# Patient Record
Sex: FEMALE | Race: BLACK OR AFRICAN AMERICAN | NOT HISPANIC OR LATINO | Employment: FULL TIME | URBAN - METROPOLITAN AREA
[De-identification: names, ages, dates, MRNs, and addresses within clinical notes are randomized per-mention and may not be internally consistent; named-entity substitution may affect disease eponyms.]

---

## 2022-05-16 ENCOUNTER — APPOINTMENT (OUTPATIENT)
Dept: RADIOLOGY | Facility: CLINIC | Age: 32
End: 2022-05-16
Attending: EMERGENCY MEDICINE
Payer: COMMERCIAL

## 2022-05-16 ENCOUNTER — HOSPITAL ENCOUNTER (EMERGENCY)
Facility: CLINIC | Age: 32
Discharge: HOME | End: 2022-05-16
Attending: EMERGENCY MEDICINE | Admitting: EMERGENCY MEDICINE
Payer: COMMERCIAL

## 2022-05-16 VITALS
SYSTOLIC BLOOD PRESSURE: 105 MMHG | BODY MASS INDEX: 45.16 KG/M2 | DIASTOLIC BLOOD PRESSURE: 89 MMHG | WEIGHT: 281 LBS | HEIGHT: 66 IN | HEART RATE: 80 BPM | TEMPERATURE: 97.6 F | OXYGEN SATURATION: 99 % | RESPIRATION RATE: 24 BRPM

## 2022-05-16 DIAGNOSIS — J45.901 MILD ASTHMA WITH EXACERBATION, UNSPECIFIED WHETHER PERSISTENT: ICD-10-CM

## 2022-05-16 LAB
ANION GAP SERPL CALCULATED.3IONS-SCNC: 11 MMOL/L (ref 5–18)
ATRIAL RATE - MUSE: 101 BPM
BASOPHILS # BLD AUTO: 0 10E3/UL (ref 0–0.2)
BASOPHILS NFR BLD AUTO: 0 %
BNP SERPL-MCNC: <10 PG/ML (ref 0–64)
BUN SERPL-MCNC: 12 MG/DL (ref 8–22)
CALCIUM SERPL-MCNC: 9.3 MG/DL (ref 8.5–10.5)
CHLORIDE BLD-SCNC: 106 MMOL/L (ref 98–107)
CO2 SERPL-SCNC: 21 MMOL/L (ref 22–31)
CREAT SERPL-MCNC: 1.04 MG/DL (ref 0.6–1.1)
DIASTOLIC BLOOD PRESSURE - MUSE: NORMAL MMHG
EOSINOPHIL # BLD AUTO: 0.1 10E3/UL (ref 0–0.7)
EOSINOPHIL NFR BLD AUTO: 1 %
ERYTHROCYTE [DISTWIDTH] IN BLOOD BY AUTOMATED COUNT: 12.7 % (ref 10–15)
FLUAV RNA SPEC QL NAA+PROBE: NEGATIVE
FLUBV RNA RESP QL NAA+PROBE: NEGATIVE
GFR SERPL CREATININE-BSD FRML MDRD: 74 ML/MIN/1.73M2
GLUCOSE BLD-MCNC: 80 MG/DL (ref 70–125)
HCG SERPL QL: NEGATIVE
HCT VFR BLD AUTO: 40.9 % (ref 35–47)
HGB BLD-MCNC: 13 G/DL (ref 11.7–15.7)
IMM GRANULOCYTES # BLD: 0 10E3/UL
IMM GRANULOCYTES NFR BLD: 0 %
INTERPRETATION ECG - MUSE: NORMAL
LYMPHOCYTES # BLD AUTO: 1.9 10E3/UL (ref 0.8–5.3)
LYMPHOCYTES NFR BLD AUTO: 22 %
MCH RBC QN AUTO: 28.8 PG (ref 26.5–33)
MCHC RBC AUTO-ENTMCNC: 31.8 G/DL (ref 31.5–36.5)
MCV RBC AUTO: 91 FL (ref 78–100)
MONOCYTES # BLD AUTO: 0.6 10E3/UL (ref 0–1.3)
MONOCYTES NFR BLD AUTO: 7 %
NEUTROPHILS # BLD AUTO: 6 10E3/UL (ref 1.6–8.3)
NEUTROPHILS NFR BLD AUTO: 70 %
NRBC # BLD AUTO: 0 10E3/UL
NRBC BLD AUTO-RTO: 0 /100
P AXIS - MUSE: 57 DEGREES
PLATELET # BLD AUTO: 278 10E3/UL (ref 150–450)
POTASSIUM BLD-SCNC: 4.5 MMOL/L (ref 3.5–5)
PR INTERVAL - MUSE: 150 MS
QRS DURATION - MUSE: 82 MS
QT - MUSE: 336 MS
QTC - MUSE: 435 MS
R AXIS - MUSE: 74 DEGREES
RBC # BLD AUTO: 4.52 10E6/UL (ref 3.8–5.2)
RSV RNA SPEC NAA+PROBE: NEGATIVE
SARS-COV-2 RNA RESP QL NAA+PROBE: NEGATIVE
SODIUM SERPL-SCNC: 138 MMOL/L (ref 136–145)
SYSTOLIC BLOOD PRESSURE - MUSE: NORMAL MMHG
T AXIS - MUSE: -24 DEGREES
TROPONIN I SERPL-MCNC: 0.13 NG/ML (ref 0–0.29)
VENTRICULAR RATE- MUSE: 101 BPM
WBC # BLD AUTO: 8.5 10E3/UL (ref 4–11)

## 2022-05-16 PROCEDURE — 84703 CHORIONIC GONADOTROPIN ASSAY: CPT | Performed by: EMERGENCY MEDICINE

## 2022-05-16 PROCEDURE — 36415 COLL VENOUS BLD VENIPUNCTURE: CPT | Performed by: EMERGENCY MEDICINE

## 2022-05-16 PROCEDURE — 85025 COMPLETE CBC W/AUTO DIFF WBC: CPT | Performed by: EMERGENCY MEDICINE

## 2022-05-16 PROCEDURE — 87637 SARSCOV2&INF A&B&RSV AMP PRB: CPT | Performed by: EMERGENCY MEDICINE

## 2022-05-16 PROCEDURE — 84484 ASSAY OF TROPONIN QUANT: CPT | Performed by: EMERGENCY MEDICINE

## 2022-05-16 PROCEDURE — 93005 ELECTROCARDIOGRAM TRACING: CPT | Performed by: EMERGENCY MEDICINE

## 2022-05-16 PROCEDURE — 94640 AIRWAY INHALATION TREATMENT: CPT

## 2022-05-16 PROCEDURE — 82310 ASSAY OF CALCIUM: CPT | Performed by: EMERGENCY MEDICINE

## 2022-05-16 PROCEDURE — 999N000157 HC STATISTIC RCP TIME EA 10 MIN

## 2022-05-16 PROCEDURE — C9803 HOPD COVID-19 SPEC COLLECT: HCPCS

## 2022-05-16 PROCEDURE — 71045 X-RAY EXAM CHEST 1 VIEW: CPT

## 2022-05-16 PROCEDURE — 83880 ASSAY OF NATRIURETIC PEPTIDE: CPT | Performed by: EMERGENCY MEDICINE

## 2022-05-16 PROCEDURE — 250N000009 HC RX 250

## 2022-05-16 PROCEDURE — 99285 EMERGENCY DEPT VISIT HI MDM: CPT | Mod: CS,25

## 2022-05-16 RX ORDER — ALBUTEROL SULFATE 90 UG/1
2 AEROSOL, METERED RESPIRATORY (INHALATION) EVERY 6 HOURS PRN
Qty: 18 G | Refills: 0 | Status: SHIPPED | OUTPATIENT
Start: 2022-05-16

## 2022-05-16 RX ORDER — ALBUTEROL SULFATE 0.83 MG/ML
2.5 SOLUTION RESPIRATORY (INHALATION)
Status: DISCONTINUED | OUTPATIENT
Start: 2022-05-16 | End: 2022-05-16 | Stop reason: HOSPADM

## 2022-05-16 RX ORDER — IPRATROPIUM BROMIDE AND ALBUTEROL SULFATE 2.5; .5 MG/3ML; MG/3ML
SOLUTION RESPIRATORY (INHALATION)
Status: COMPLETED
Start: 2022-05-16 | End: 2022-05-16

## 2022-05-16 RX ADMIN — IPRATROPIUM BROMIDE AND ALBUTEROL SULFATE 3 ML: 2.5; .5 SOLUTION RESPIRATORY (INHALATION) at 12:16

## 2022-05-16 NOTE — DISCHARGE INSTRUCTIONS
Our primary care medical scheduling team will reach out to you this week to help you to set up an appointment

## 2022-05-16 NOTE — ED PROVIDER NOTES
EMERGENCY DEPARTMENT ENCOUNTER      NAME: Slime Berrios  AGE: 31 year old female  YOB: 1990  MRN: [id number]  EVALUATION DATE & TIME: 5/16/2022 12:04 PM    PCP: No primary care provider on file.    ED PROVIDER: Brandi Otero M.D.      Chief Complaint   Patient presents with     Shortness of Breath     Chest Pain         FINAL IMPRESSION:  1. Mild asthma with exacerbation, unspecified whether persistent          ED COURSE & MEDICAL DECISION MAKING:    ED Course as of 05/16/22 1606   Mon May 16, 2022   1308 Pt reassuringly with normal VS, sat 99% RA and CXR unremarkable, pt given albuterol x1 and feels improved, felt like she had episode of SOB after workout and vaping, amenable to hold on vaping, h/o asthma, needs MDI to have at home which sounds reasaonble. Screening XR and labs pending and will clincially reasses,s pt ameanble to plan.   1358 EKG reassuring, CXR negative for acute pathology and COVID19 and influenza and RSV negative.    1358 Screening chemistry and CBC  WNL and patient eager for discontinue to home, givne Rx for albuterol MDI and PMD f/up. Patient discharged after being provided with extensive anticipatory guidance and given return precautions, importance of PMD follow-up emphasized.        Pertinent Labs & Imaging studies reviewed. (See chart for details)    N95 worn  A face shield was worn also  COVID PPE    12:59 PM I met with the patient, obtained history, performed an initial exam, and discussed options and plan for diagnostics and treatment here in the ED.  2:05 PM I rechecked and updated the patient on results. Discussed plans for discharge.    At the conclusion of the encounter I discussed the results of all of the tests and the disposition. The questions were answered. The patient or family acknowledged understanding and was agreeable with the care plan.     MEDICATIONS GIVEN IN THE EMERGENCY:  Medications   albuterol (PROVENTIL) neb solution 2.5 mg (has no administration  in time range)   ipratropium - albuterol 0.5 mg/2.5 mg/3 mL (DUONEB) 0.5-2.5 (3) MG/3ML neb solution (3 mLs  Given 5/16/22 1216)       NEW PRESCRIPTIONS STARTED AT TODAY'S ER VISIT  Discharge Medication List as of 5/16/2022  2:26 PM      START taking these medications    Details   albuterol (PROAIR HFA/PROVENTIL HFA/VENTOLIN HFA) 108 (90 Base) MCG/ACT inhaler Inhale 2 puffs into the lungs every 6 hours as needed for shortness of breath / dyspnea or wheezing, Disp-18 g, R-0, [facility] may dispense brand covered by insurance (Proair, or proventil or ventolin or generic albuterol inhaler)                =================================================================    HPI      Slime Berrios is a 31 year old female with PMHx of asthma who presents to the ED today via walk in with cough, shortness of breath. Patient reports she was exercising earlier today and started to develop a cough, lightheadedness, shortness of breath. Afterwards, she took a hit of her vape which she reports made her symptoms worse. She has a history of asthma.    Denies recent steroid use. Patient states she has not used an inhaler in a wellington time and is currently requesting one. Denies personal history of DVT, PE. No recent sick contacts. Patient is not concerned for risk of pregnancy.      REVIEW OF SYSTEMS   All other systems reviewed and are negative except as noted above in HPI.    PAST MEDICAL HISTORY:  History reviewed. No pertinent past medical history.    PAST SURGICAL HISTORY:  History reviewed. No pertinent surgical history.    CURRENT MEDICATIONS:    albuterol (PROAIR HFA/PROVENTIL HFA/VENTOLIN HFA) 108 (90 Base) MCG/ACT inhaler        ALLERGIES:  Allergies   Allergen Reactions     Cephalosporins Unknown       FAMILY HISTORY:  History reviewed. No pertinent family history.    SOCIAL HISTORY:   Social History     Socioeconomic History     Marital status: Single       VITALS:  Patient Vitals for the past 24 hrs:   BP Temp  "Temp src Pulse Resp SpO2 Height Weight   05/16/22 1216 -- -- -- -- -- 99 % -- --   05/16/22 1150 105/89 97.6  F (36.4  C) Oral 80 24 99 % 1.676 m (5' 6\") 127.5 kg (281 lb)       PHYSICAL EXAM    GENERAL: Awake, alert.  In no acute distress.   HEENT: Normocephalic, atraumatic.  Pupils equal, round and reactive.  Conjunctiva normal.  EOMI.  NECK: No stridor or apparent deformity.  PULMONARY: Symmetrical breath sounds without distress.  Lungs clear to auscultation bilaterally without wheezes, rhonchi or rales.  CARDIO: Regular rate and rhythm.  No significant murmur, rub or gallop.  Radial pulses strong and symmetrical.  ABDOMINAL: Abdomen soft, non-distended and non-tender to palpation.  No CVAT, no palpable hepatosplenomegaly.  EXTREMITIES: No lower extremity swelling or edema.    NEURO: Alert and oriented to person, place and time.  Cranial nerves grossly intact.  No focal motor deficit.  PSYCH: Normal mood and affect  SKIN: No rashes      LAB:  All pertinent labs reviewed and interpreted.  Results for orders placed or performed during the hospital encounter of 05/16/22   XR Chest 1 View    Impression    IMPRESSION: No pneumothorax or pleural effusion. The lungs appear clear. Normal heart size. No pulmonary vascular congestion.   Basic metabolic panel   Result Value Ref Range    Sodium 138 136 - 145 mmol/L    Potassium 4.5 3.5 - 5.0 mmol/L    Chloride 106 98 - 107 mmol/L    Carbon Dioxide (CO2) 21 (L) 22 - 31 mmol/L    Anion Gap 11 5 - 18 mmol/L    Urea Nitrogen 12 8 - 22 mg/dL    Creatinine 1.04 0.60 - 1.10 mg/dL    Calcium 9.3 8.5 - 10.5 mg/dL    Glucose 80 70 - 125 mg/dL    GFR Estimate 74 >60 mL/min/1.73m2   Result Value Ref Range    Troponin I 0.13 0.00 - 0.29 ng/mL   B-Type Natriuretic Peptide ([facility] Only)   Result Value Ref Range    BNP <10 0 - 64 pg/mL   Symptomatic; Unknown Influenza A/B & SARS-CoV2 (COVID-19) Virus PCR Multiplex Nasopharyngeal    Specimen: Nasopharyngeal; Swab   Result Value Ref Range    " Influenza A PCR Negative Negative    Influenza B PCR Negative Negative    RSV PCR Negative Negative    SARS CoV2 PCR Negative Negative   HCG qualitative Blood   Result Value Ref Range    hCG Serum Qualitative Negative Negative   CBC with platelets and differential   Result Value Ref Range    WBC Count 8.5 4.0 - 11.0 10e3/uL    RBC Count 4.52 3.80 - 5.20 10e6/uL    Hemoglobin 13.0 11.7 - 15.7 g/dL    Hematocrit 40.9 35.0 - 47.0 %    MCV 91 78 - 100 fL    MCH 28.8 26.5 - 33.0 pg    MCHC 31.8 31.5 - 36.5 g/dL    RDW 12.7 10.0 - 15.0 %    Platelet Count 278 150 - 450 10e3/uL    % Neutrophils 70 %    % Lymphocytes 22 %    % Monocytes 7 %    % Eosinophils 1 %    % Basophils 0 %    % Immature Granulocytes 0 %    NRBCs per 100 WBC 0 <1 /100    Absolute Neutrophils 6.0 1.6 - 8.3 10e3/uL    Absolute Lymphocytes 1.9 0.8 - 5.3 10e3/uL    Absolute Monocytes 0.6 0.0 - 1.3 10e3/uL    Absolute Eosinophils 0.1 0.0 - 0.7 10e3/uL    Absolute Basophils 0.0 0.0 - 0.2 10e3/uL    Absolute Immature Granulocytes 0.0 <=0.4 10e3/uL    Absolute NRBCs 0.0 10e3/uL   ECG 12-LEAD WITH MUSE ([facility])   Result Value Ref Range    Systolic Blood Pressure  mmHg    Diastolic Blood Pressure  mmHg    Ventricular Rate 101 BPM    Atrial Rate 101 BPM    MO Interval 150 ms    QRS Duration 82 ms     ms    QTc 435 ms    P Axis 57 degrees    R AXIS 74 degrees    T Axis -24 degrees    Interpretation ECG       Sinus tachycardia  Right atrial enlargement  T wave abnormality, consider inferior ischemia  Abnormal ECG  No previous ECGs available  Confirmed by SEE ED PROVIDER NOTE FOR, ECG INTERPRETATION ([id number]),  MILLY ROSALES ([id number]) on 5/16/2022 1:04:50 PM         RADIOLOGY:  Reviewed all pertinent imaging. Please see official radiology report.  XR Chest 1 View   Final Result   IMPRESSION: No pneumothorax or pleural effusion. The lungs appear clear. Normal heart size. No pulmonary vascular congestion.            EKG:    Reviewed and interpreted as: 1154  sinus rhythm  without ST changes, T waves inverted isolated to III and aVF      I have independently reviewed and interpreted the EKG(s) documented above.        I, Serafin Hanks, am serving as a scribe to document services personally performed by Dr. Brandi Otero based on my observation and the provider's statements to me. I, Brandi Otero MD attest that Serafin Hanks is acting in a scribe capacity, has observed my performance of the services and has documented them in accordance with my direction.     Brandi Otero MD  05/16/22 [id number]

## 2022-05-16 NOTE — ED TRIAGE NOTES
Pt reports cough, shortness of breath and chest pain that started after a hard workout about 15 mins ago.  Pt states she also took a vape at the same time.  Pt reports feeling fine prior to the workout.

## 2023-12-06 ENCOUNTER — HOSPITAL ENCOUNTER (EMERGENCY)
Facility: CLINIC | Age: 33
Discharge: HOME | End: 2023-12-06
Attending: EMERGENCY MEDICINE | Admitting: EMERGENCY MEDICINE
Payer: COMMERCIAL

## 2023-12-06 VITALS
WEIGHT: 292 LBS | BODY MASS INDEX: 47.13 KG/M2 | OXYGEN SATURATION: 96 % | DIASTOLIC BLOOD PRESSURE: 78 MMHG | SYSTOLIC BLOOD PRESSURE: 124 MMHG | HEART RATE: 81 BPM | TEMPERATURE: 97.7 F | RESPIRATION RATE: 16 BRPM

## 2023-12-06 DIAGNOSIS — L02.91 ABSCESS: ICD-10-CM

## 2023-12-06 PROCEDURE — 99283 EMERGENCY DEPT VISIT LOW MDM: CPT

## 2023-12-06 PROCEDURE — 99284 EMERGENCY DEPT VISIT MOD MDM: CPT

## 2023-12-06 RX ORDER — LIDOCAINE 40 MG/G
CREAM TOPICAL
Qty: 5 G | Refills: 0 | Status: SHIPPED | OUTPATIENT
Start: 2023-12-06

## 2023-12-06 RX ORDER — SULFAMETHOXAZOLE/TRIMETHOPRIM 800-160 MG
1 TABLET ORAL 2 TIMES DAILY
Qty: 14 TABLET | Refills: 0 | Status: SHIPPED | OUTPATIENT
Start: 2023-12-06 | End: 2023-12-13

## 2023-12-06 ASSESSMENT — ENCOUNTER SYMPTOMS
CHILLS: 0
FEVER: 0

## 2023-12-06 NOTE — ED PROVIDER NOTES
EMERGENCY DEPARTMENT ENCOUNTER      NAME: Slime Berrios  AGE: 33 year old female  YOB: 1990  MRN: [id number]  EVALUATION DATE & TIME: 12/6/2023 12:28 AM    PCP: No Ref-Primary, Physician    ED PROVIDER: Evette De La O M.D.      Chief Complaint   Patient presents with    Wound Infection         FINAL IMPRESSION:  1. Abscess        MEDICAL DECISION MAKING:    Pertinent Labs & Imaging studies reviewed. (See chart for details)  ED Course as of 12/06/23 0348   Wed Dec 06, 2023   0041 Palpated vital signs are unremarkable.  Patient presenting with an abscess to her left labia.  She states that she has had a lump there for approximately the last year.  Does get more swollen and seem more inflamed today.  She had an episode where started open and she drained a large amount of purulent discharge.  She also removed a small clump of hair.  Says she is having pain in that area.  No fevers chills.  Has had Bartholin cyst in the past.  She says occasionally she gets ingrown hairs but is never been diagnosed with MRSA.  She currently is on antibiotics for tooth infection and has been taking amoxicillin.  She is an allergy to cephalosporin.    Exam for patient here she has a large indurated area to her left labia.  There is no crepitus.  Area is open, no active drainage at this point.  She does have some erythema and redness and mild swelling around her left labia.  Otherwise large indurated area, there are is no fluid to express and no liquid abscess pocket.      Encourage patient to continue with the warm compresses which is what she has been doing over the last few days.  Will discharge home with instruction to keep area clean, some lidocaine cream in the area to see if this does help.  I will start her on some Bactrim here as she does look like she has a possible small area of cellulitis and she has had multiple abscesses which have been smaller in this area in the past.  Otherwise here looks well.  Will  discharge home with antibiotics, instructions on warm compress and follow-up with primary care.         Medical Decision Making    History:  Supplemental history from: Documented in chart, if applicable  External Record(s) reviewed: Documented in chart, if applicable.    Work Up:  Chart documentation includes differential considered and any EKGs or imaging independently interpreted by provider, where specified.  In additional to work up documented, I considered the following work up: Documented in chart, if applicable.    External consultation:  Discussion of management with another provider: Documented in chart, if applicable    Complicating factors:  Care impacted by chronic illness: N/A  Care affected by social determinants of health: N/A    Disposition considerations: Discharge. I prescribed additional prescription strength medication(s) as charted. See documentation for any additional details.          Critical care: 0 minutes excluding separately billable procedures.  Includes bedside management, time reviewing test results, review of records, discussing the case with staff, documenting the medical record and time spent with family members (or surrogate decision makers) discussing specific treatment issues.          ED COURSE:  12:32 AM I met with the patient, obtained history, performed an initial exam, and discussed options and plan for diagnostics and treatment here in the ED.     The importance of close follow up was discussed. We reviewed warning signs and symptoms, and I instructed Ms. Berrios to return to the emergency department immediately if she develops any new or worsening symptoms. I provided additional verbal discharge instructions. Ms. Berrios expressed understanding and agreement with this plan of care, her questions were answered, and she was discharged in stable condition.     MEDICATIONS GIVEN IN THE EMERGENCY:  Medications - No data to display    NEW PRESCRIPTIONS STARTED AT TODAY'S ER  VISIT:  Discharge Medication List as of 12/6/2023 12:58 AM        START taking these medications    Details   lidocaine (LMX4) 4 % external cream Apply topically once as needed for mild painDisp-5 g, R-0Local Print      sulfamethoxazole-trimethoprim (BACTRIM DS) 800-160 MG tablet Take 1 tablet by mouth 2 times daily for 7 days, Disp-14 tablet, R-0, Local Print                =================================================================    HPI    Patient information was obtained from: Patient    Use of : N/A     Slime Berrios is a 33 year old female with no pertinent medical history, who presents with a wound infection.    The patient reports here with an abscess to her left labia.  She states that she has had this lump for the last year, more swollen and inflamed recently.   She had an episode where started open and she drained a large amount of purulent discharge.  She also removed a small clump of hair.  Says she is having pain in that area.  No fevers chills. Of note, she has had a Bartholin cyst in the past.   She says occasionally she gets ingrown hairs but is never been diagnosed with MRSA.  She currently is on antibiotics for tooth infection and has been taking amoxicillin.  She is an allergy to cephalosporin   REVIEW OF SYSTEMS   Review of Systems   Constitutional:  Negative for chills and fever.   Skin:         Positive for abscess to left labia with associated swelling, pain, and inflmation   All other systems reviewed and are negative.    PAST MEDICAL HISTORY:  No past medical history on file.    PAST SURGICAL HISTORY:  No past surgical history on file.    CURRENT MEDICATIONS:    No current facility-administered medications for this encounter.    Current Outpatient Medications:     lidocaine (LMX4) 4 % external cream, Apply topically once as needed for mild pain, Disp: 5 g, Rfl: 0    sulfamethoxazole-trimethoprim (BACTRIM DS) 800-160 MG tablet, Take 1 tablet by mouth 2 times daily for 7  days, Disp: 14 tablet, Rfl: 0    albuterol (PROAIR HFA/PROVENTIL HFA/VENTOLIN HFA) 108 (90 Base) MCG/ACT inhaler, Inhale 2 puffs into the lungs every 6 hours as needed for shortness of breath / dyspnea or wheezing, Disp: 18 g, Rfl: 0    ALLERGIES:  Allergies   Allergen Reactions    Cephalosporins Unknown       FAMILY HISTORY:  No family history on file.    SOCIAL HISTORY:   Social History     Socioeconomic History    Marital status: Single       PHYSICAL EXAM:    Vitals: /78   Pulse 81   Temp 97.7  F (36.5  C) (Oral)   Resp 16   Wt 132.5 kg (292 lb)   LMP 11/23/2023   SpO2 96%   BMI 47.13 kg/m     General:. Alert and interactive, comfortable appearing.  HENT: Oropharynx without erythema or exudates. MMM.  TMs clear bilaterally.  Eyes: Pupils mid-sized and equally reactive.   Neck: Full AROM.  No midline tenderness to palpation.  Cardiovascular: Regular rate and rhythm. Peripheral pulses 2+ bilaterally.  Chest/Pulmonary: Normal work of breathing. Lung sounds clear and equal throughout, no wheezes or crackles. No chest wall tenderness or deformities.  Abdomen: Soft, nondistended. Nontender without guarding or rebound.  Back/Spine: No CVA or midline tenderness.  Extremities: Normal ROM of all major joints. No lower extremity edema.   Skin: Warm and dry. Normal skin color. Patient here she has a large indurated area to her left labia.  There is no crepitus.  Area is open, no active drainage at this point.  She does have some erythema and redness and mild swelling around her left labia.  Otherwise large indurated area, there are is no fluid to express and no liquid abscess pocket.    Neuro: Speech clear. CNs grossly intact. Moves all extremities appropriately. Strength and sensation grossly intact to all extremities.   Psych: Normal affect/mood, cooperative, memory appropriate.     LAB:  All pertinent labs reviewed and interpreted.  Labs Ordered and Resulted from Time of ED Arrival to Time of ED Departure -  No data to display    RADIOLOGY:  No orders to display       EKG:  See MDM  PROCEDURES:   None    I, Rhett Soriano, am serving as a scribe to document services personally performed by Dr. Evette De La O  based on my observation and the provider's statements to me. I, Evette De La O MD attest that Rhett Bo is acting in a scribe capacity, has observed my performance of the services and has documented them in accordance with my direction.      Evette De La O M.D.  Emergency Medicine  [facility] EMERGENCY ROOM  [address] [place] [address]  [phone]  Dept: [phone]      Evette De La O MD  12/06/23 0348

## 2023-12-06 NOTE — DISCHARGE INSTRUCTIONS
Continue with warm compresses.  Take antibiotics as prescribed.  Follow-up with primary care.  Return for any new or worsening symptoms.

## 2023-12-06 NOTE — ED TRIAGE NOTES
Left inner thigh at crease to pelvic has a possible abscess that ruptured. Is painful made worse to open air. Has continued drainage. Initially the drainage had foul order.